# Patient Record
Sex: FEMALE | Race: BLACK OR AFRICAN AMERICAN | NOT HISPANIC OR LATINO | URBAN - METROPOLITAN AREA
[De-identification: names, ages, dates, MRNs, and addresses within clinical notes are randomized per-mention and may not be internally consistent; named-entity substitution may affect disease eponyms.]

---
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
## 2019-06-03 ENCOUNTER — EMERGENCY (EMERGENCY)
Facility: HOSPITAL | Age: 19
LOS: 0 days | Discharge: HOME | End: 2019-06-03
Attending: EMERGENCY MEDICINE | Admitting: EMERGENCY MEDICINE
Payer: MEDICAID

## 2019-06-03 VITALS
RESPIRATION RATE: 18 BRPM | TEMPERATURE: 98 F | HEART RATE: 99 BPM | OXYGEN SATURATION: 98 % | DIASTOLIC BLOOD PRESSURE: 63 MMHG | SYSTOLIC BLOOD PRESSURE: 119 MMHG

## 2019-06-03 VITALS — WEIGHT: 201.5 LBS

## 2019-06-03 DIAGNOSIS — Z88.0 ALLERGY STATUS TO PENICILLIN: ICD-10-CM

## 2019-06-03 DIAGNOSIS — M25.473 EFFUSION, UNSPECIFIED ANKLE: ICD-10-CM

## 2019-06-03 DIAGNOSIS — M79.89 OTHER SPECIFIED SOFT TISSUE DISORDERS: ICD-10-CM

## 2019-06-03 LAB
APPEARANCE UR: ABNORMAL
BILIRUB UR-MCNC: NEGATIVE — SIGNIFICANT CHANGE UP
COLOR SPEC: YELLOW — SIGNIFICANT CHANGE UP
DIFF PNL FLD: NEGATIVE — SIGNIFICANT CHANGE UP
EPI CELLS # UR: ABNORMAL /HPF
GLUCOSE UR QL: NEGATIVE MG/DL — SIGNIFICANT CHANGE UP
KETONES UR-MCNC: NEGATIVE — SIGNIFICANT CHANGE UP
LEUKOCYTE ESTERASE UR-ACNC: NEGATIVE — SIGNIFICANT CHANGE UP
NITRITE UR-MCNC: NEGATIVE — SIGNIFICANT CHANGE UP
PH UR: 6.5 — SIGNIFICANT CHANGE UP (ref 5–8)
PROT UR-MCNC: NEGATIVE MG/DL — SIGNIFICANT CHANGE UP
SP GR SPEC: <=1.005 — SIGNIFICANT CHANGE UP (ref 1.01–1.03)
UROBILINOGEN FLD QL: 1 MG/DL (ref 0.2–0.2)

## 2019-06-03 PROCEDURE — 73610 X-RAY EXAM OF ANKLE: CPT | Mod: 26,LT

## 2019-06-03 PROCEDURE — 71046 X-RAY EXAM CHEST 2 VIEWS: CPT | Mod: 26

## 2019-06-03 PROCEDURE — 99283 EMERGENCY DEPT VISIT LOW MDM: CPT

## 2019-06-03 RX ORDER — DIPHENHYDRAMINE HCL 50 MG
25 CAPSULE ORAL ONCE
Refills: 0 | Status: COMPLETED | OUTPATIENT
Start: 2019-06-03 | End: 2019-06-03

## 2019-06-03 RX ADMIN — Medication 25 MILLIGRAM(S): at 20:01

## 2019-06-03 NOTE — ED PROVIDER NOTE - PROGRESS NOTE DETAILS
xray is neg UA is neg patient doesn't want to stay in the ed xray is neg UA is neg patient doesn't want to stay in the ed.

## 2019-06-03 NOTE — ED PROVIDER NOTE - OBJECTIVE STATEMENT
18yF with no pmh 18yF with pmh mood disorder on ability, depakote, lithium, intunif, recent dx of chlamydia s/p 3/14 days of doxy, p/w BL ankle swelling and subjective L hand swelling X 2 days. no sob cp orthopnea pleurisy calf pain, trauma, fever chills erythema. no significant wt loss or gain, palpitations or temperature disturbance.

## 2019-06-03 NOTE — ED PROVIDER NOTE - CLINICAL SUMMARY MEDICAL DECISION MAKING FREE TEXT BOX
18yr with bl ankle swelling possible trauma xray is neg UA is neg patient wanted to leave the ed  ED evaluation and management discussed with the  patient in detail.  Close PMD follow up encouraged.  Strict ED return instructions discussed in detail and patient was given the opportunity to ask any questions about their discharge diagnosis and instructions. Patient  verbalized understanding.

## 2019-06-03 NOTE — ED PEDIATRIC NURSE NOTE - NSIMPLEMENTINTERV_GEN_ALL_ED
Implemented All Universal Safety Interventions:  [place] to call system. Call bell, personal items and telephone within reach. Instruct patient to call for assistance. Room bathroom lighting operational. Non-slip footwear when patient is off stretcher. Physically safe environment: no spills, clutter or unnecessary equipment. Stretcher in lowest position, wheels locked, appropriate side rails in place.

## 2019-06-03 NOTE — ED PROVIDER NOTE - PHYSICAL EXAMINATION
Vital Signs: I have reviewed the initial vital signs.  Constitutional: NAD, well-nourished, appears stated age, no acute distress.  HEENT: Airway patent, moist MM, no erythema/swelling/deformity of oral structures. EOMI, PERRLA.  CV: regular rate, regular rhythm, well-perfused extremities, 2+ b/l DP and radial pulses equal.  Lungs: BCTA, no increased WOB.  ABD: NTND, no guarding or rebound, no pulsatile mass, no hernias.   MSK: Neck supple, nontender, nl ROM, no stepoff. Chest nontender. Back nontender in TLS spine or to b/l bony structures or flanks. Ext nontender, nl rom, no deformity.   INTEG: Skin warm, dry, no rash. 1+ nonpitting edema BL  NEURO: A&Ox3, moving all extremities, normal speech  PSYCH: Calm, cooperative, normal affect and interaction.

## 2019-06-03 NOTE — ED PROVIDER NOTE - NS ED ROS FT
Review of Systems    Constitutional: (-) fever  Cardiovascular: (-) chest pain, (-) syncope  Respiratory: (-) cough, (-) shortness of breath  Gastrointestinal: (-) vomiting, (-) diarrhea, (-) abdominal pain  Musculoskeletal: (-) neck pain, (-) back pain, (-) joint pain  Integumentary: (-) rash, (+) edema  Neurological: (-) headache, (-) altered mental status    Except as documented in the HPI, all other systems are negative. Yes

## 2019-06-03 NOTE — ED PROVIDER NOTE - ATTENDING CONTRIBUTION TO CARE
18yr started doxycline for the past three days now noticed bl foot swelling in the ankles left hand swelling and right hand swelling no other symptoms and chornic cough no fever no chils no nausea no emesis  VS reviewed, stable.  Gen: interactive, well appearing, no acute distress  HEENT: NC/AT, TM non bulging bl no evidence of mastoiditis,  moist mucus membranes, pupils equal, responsive, reactive to light and accomodation, no conjunctivitis or scleral icterus; no nasal discharge .   OP no exudates no erythema  Neck: FROM, supple, no cervical LAD  Chest: CTA b/l, no crackles/wheezes, good air entry, no tachypnea or retractions  CV: regular rate and rhythm, no murmurs   Abd: soft, nontender, nondistended, no HSM appreciated, +BS  bl non pitting edema of the ankles non tender  plan - UA xray

## 2020-01-11 ENCOUNTER — EMERGENCY (EMERGENCY)
Facility: HOSPITAL | Age: 20
LOS: 0 days | Discharge: HOME | End: 2020-01-11
Attending: EMERGENCY MEDICINE | Admitting: EMERGENCY MEDICINE
Payer: MEDICAID

## 2020-01-11 VITALS
RESPIRATION RATE: 20 BRPM | SYSTOLIC BLOOD PRESSURE: 128 MMHG | DIASTOLIC BLOOD PRESSURE: 58 MMHG | HEART RATE: 68 BPM | OXYGEN SATURATION: 100 % | TEMPERATURE: 99 F

## 2020-01-11 DIAGNOSIS — F31.9 BIPOLAR DISORDER, UNSPECIFIED: ICD-10-CM

## 2020-01-11 DIAGNOSIS — R45.851 SUICIDAL IDEATIONS: ICD-10-CM

## 2020-01-11 DIAGNOSIS — Z88.0 ALLERGY STATUS TO PENICILLIN: ICD-10-CM

## 2020-01-11 PROCEDURE — 99283 EMERGENCY DEPT VISIT LOW MDM: CPT

## 2020-01-11 RX ORDER — ARIPIPRAZOLE 15 MG/1
1 TABLET ORAL
Qty: 3 | Refills: 0
Start: 2020-01-11 | End: 2020-01-13

## 2020-01-11 NOTE — ED PEDIATRIC TRIAGE NOTE - CHIEF COMPLAINT QUOTE
patient monicaa after friend called ems stating shes non compliant with medications patient denying si hi

## 2020-01-11 NOTE — ED BEHAVIORAL HEALTH ASSESSMENT NOTE - OTHER PAST PSYCHIATRIC HISTORY (INCLUDE DETAILS REGARDING ONSET, COURSE OF ILLNESS, INPATIENT/OUTPATIENT TREATMENT)
hx of bipolar disorder, ADHD, and ODD, no prior IPP admissions or suicide attempts, in outpatient tx with psychiatrist Dr. Brown and a therapist at [facility]

## 2020-01-11 NOTE — ED BEHAVIORAL HEALTH ASSESSMENT NOTE - MEDICATIONS (PRESCRIPTIONS, DIRECTIONS)
patient will discuss her psychiatric medications with Dr. Brown but at this time, since she has none of her medications accessible to her, recommend ED to prescribe abilify 5mg BID for mood lability (3 day supply)- Pt's home dosage is abilify 30mg Qdaily but she has not taken it for the past month

## 2020-01-11 NOTE — ED BEHAVIORAL HEALTH ASSESSMENT NOTE - DETAILS
none lithium- urinary incontinence pt reports that her biological mother has hx of bipolar d/o spoke to ED team

## 2020-01-11 NOTE — ED PROVIDER NOTE - NSFOLLOWUPINSTRUCTIONS_ED_ALL_ED_FT
Please follow up with your psychiatrist on Monday for further evaluation. Return to the emergency department sooner for any new or worrisome symptoms. Good luck.             Suicidal Feelings: How to Help Yourself  Suicide is when you end your own life. There are many things you can do to help yourself feel better when struggling with these feelings. Many services and people are available to support you and others who struggle with similar feelings.     If you ever feel like you may hurt yourself or others, or have thoughts about taking your own life, get help right away. To get help:   Call your local emergency services (911 in the [place]   Go to your nearest emergency department.   Call a suicide hotline to speak with a trained counselor. The following suicide hotlines are available in the [place]:  6-683-178-TALK (1-867.740.6038).  4-412-FKWANTF (1-513.388.5054).  1-676.893.3689. This is a hotline for [place] speakers.  1-477.186.6087. This is a hotline for TTY users.  5-833-5-U-JAMISON (1-641.706.6216). This is a hotline for lesbian, coronel, bisexual, transgender, or questioning youth.  For a list of hotlines in [place], visit www.suicide.org/hotlines/international/zigxal-mpzwfar-wgseomsf.html  Contact a crisis center or a local suicide prevention center. To find a crisis center or suicide prevention center:  Call your local hospital, clinic, community service organization, mental health center, social service provider, or health department. Ask for help with connecting to a crisis center.  For a list of crisis centers in the [place], visit: suicidepreventionlifeline.org  For a list of crisis centers in [place], visit: suicideprevention.ca  How to help yourself feel better  Image   Promise yourself that you will not do anything extreme when you have suicidal feelings. Remember, there is hope. Many people have gotten through suicidal thoughts and feelings, and you can too. If you have had these feelings before, remind yourself that you can get through them again.  Let family, friends, teachers, or counselors know how you are feeling. Try not to separate yourself from those who care about you and want to help you. Talk with someone every day, even if you do not feel sociable. Face-to-face conversation is best to help them understand your feelings.  Contact a mental health care provider and work with this person regularly.  Make a safety plan that you can follow during a crisis. Include phone numbers of suicide prevention hotlines, mental health professionals, and trusted friends and family members you can call during an emergency. Save these numbers on your phone.  If you are thinking of taking a lot of medicine, give your medicine to someone who can give it to you as prescribed. If you are on antidepressants and are concerned you will overdose, tell your health care provider so that he or she can give you safer medicines.  Try to stick to your routines. Follow a schedule every day. Make self-care a priority.  Make a list of realistic goals, and cross them off when you achieve them. Accomplishments can give you a sense of worth.  Wait until you are feeling better before doing things that you find difficult or unpleasant.  Do things that you have always enjoyed to take your mind off your feelings. Try reading a book, or listening to or playing music. Spending time outside, in nature, may help you feel better.  Follow these instructions at home:  Image   Visit your primary health care provider every year for a checkup.  Work with a mental health care provider as needed.  Eat a well-balanced diet, and eat regular meals.  Get plenty of rest.  Exercise if you are able. Just 30 minutes of exercise each day can help you feel better.  Take over-the-counter and prescription medicines only as told by your health care provider. Ask your mental health care provider about the possible side effects of any medicines you are taking.  Do not use alcohol or drugs, and remove these substances from your home.  Remove weapons, poisons, knives, and other deadly items from your home.  General recommendations  Keep your living space well lit.  When you are feeling well, write yourself a letter with tips and support that you can read when you are not feeling well.  Remember that life's difficulties can be sorted out with help. Conditions can be treated, and you can learn behaviors and ways of thinking that will help you.  Where to find more information  National Suicide Prevention Lifeline: www.suicidepreventionlifeline.org  Hopeline: www.hopeline.com  American Foundation for Suicide Prevention: www.afsp.org  The [place] Project (for lesbian, coronel, bisexual, transgender, or questioning youth): www.thetrevorproject.org  Contact a health care provider if:  You feel as though you are a burden to others.  You feel agitated, angry, vengeful, or have extreme mood swings.  You have withdrawn from family and friends.  Get help right away if:  You are talking about suicide or wishing to die.  You start making plans for how to commit suicide.  You feel that you have no reason to live.  You start making plans for putting your affairs in order, saying goodbye, or giving your possessions away.  You feel guilt, shame, or unbearable pain, and it seems like there is no way out.  You are frequently using drugs or alcohol.  You are engaging in risky behaviors that could lead to death.  If you have any of these symptoms, get help right away. Call emergency services, go to your nearest emergency department or crisis center, or call a suicide crisis helpline.     Summary  Suicide is when you take your own life.  Promise yourself that you will not do anything extreme when you have suicidal feelings.  Let family, friends, teachers, or counselors know how you are feeling.  Get help right away if you feel as though life is getting too tough to handle and you are thinking about suicide.  This information is not intended to replace advice given to you by your health care provider. Make sure you discuss any questions you have with your health care provider.    Document Released: 06/23/2004 Document Revised: 07/31/2018 Document Reviewed: 07/31/2018  Elsevier Interactive Patient Education © 2019 Elsevier Inc.

## 2020-01-11 NOTE — ED PROVIDER NOTE - OBJECTIVE STATEMENT
19yF pmhx bipolar disorder accompanied by father presents for suicidal ideation; per EMS, call received for pt w/ active suicidal thoughts; per pt, was at friend's house when friend and boyfriend got into argument, she hid in bathroom locked door and refused to get out, after trying to get her out friend called 911 reporting pt suicidal; both pt and father deny ever having hx of suicidal ideation, and pt states she is not currently suicidal, in addition denies homicidal ideation. Pt states she has not been taking her bipolar meds because "they make me wet the bed at night" and has not felt any increase in her symptoms since stopping a few months ago. Father reports pt having erratic behavior, has missed 2 appts w/ psychiatrist--pt states it is because she is trying to work in [place] and does not want to go back to [place]. Denies fever, n/v/d.

## 2020-01-11 NOTE — ED ADULT NURSE NOTE - PMH
Anxiety    Attention deficit hyperactivity disorder (ADHD), unspecified ADHD type    Bipolar 1 disorder

## 2020-01-11 NOTE — ED BEHAVIORAL HEALTH ASSESSMENT NOTE - DESCRIPTION
pt lying on ED stretcher in NAD w/ 1:1 at bedside none , single, adopted at age 7, has 4 biological siblings and is middle child, female, domiciled with her father and siblings in private residence, enrolled at [employer] for [employer] but recently suspended this past Thursday, wants to be a

## 2020-01-11 NOTE — ED PROVIDER NOTE - ATTENDING CONTRIBUTION TO CARE
19 year old female, pmhx of bipolar, bibems for evaluation of suicidal ideation, patient was at a friends house and the friend called ems stating that she is worried about the patient. Patient denies si or hi, no a/v hallucinations, no cp/sob, no n/v/d    CONSTITUTIONAL: Well-developed; well-nourished; in no acute distress. Sitting up and providing appropriate history and physical examination  SKIN: skin exam is warm and dry, no acute rash.  HEAD: Normocephalic; atraumatic.  EYES: PERRL, 3 mm bilateral, no nystagmus, EOM intact; conjunctiva and sclera clear.  ENT: No nasal discharge; airway clear.  NECK: Supple; non tender. + full passive ROM in all directions. No JVD  CARD: S1, S2 normal; no murmurs, gallops, or rubs. Regular rate and rhythm. + Symmetric Strong Pulses  RESP: No wheezes, rales or rhonchi. Good air movement bilaterally  ABD: soft; non-distended; non-tender. No Rebound, No Guarding, No signs of peritonitis, No CVA tenderness. No pulsatile abdominal mass. + Strong and Symmetric Pulses  EXT: Normal ROM. No clubbing, cyanosis or edema. Dp and Pt Pulses intact. Cap refill less than 3 seconds  NEURO: CN 2-12 intact, normal finger to nose, normal romberg, stable gait, no sensory or motor deficits, Alert, oriented, grossly unremarkable. No Focal deficits. GCS 15. NIH 0  PSYCH: Cooperative, appropriate. No si or hi, no a/v hallucinations

## 2020-01-11 NOTE — ED BEHAVIORAL HEALTH ASSESSMENT NOTE - SAFETY PLAN DETAILS
Pt instructed that should she have thoughts of wanting to harm herself or others, she should seek medical attention immediately.

## 2020-01-11 NOTE — ED PEDIATRIC NURSE REASSESSMENT NOTE - NS ED NURSE REASSESS COMMENT FT2
pt. alert and oriented times four. VSS. pt. denies any suicidal or homicidal ideations, pt. placed on 1:1 pt. undressed in room. psych at bedside. as per psych pt. stable for d'c will f/u with OP mental health pt. verbalized understanding of discharge instructions

## 2020-01-11 NOTE — ED PROVIDER NOTE - NS ED ROS FT
Review of Systems:  	•	CONSTITUTIONAL - no fever, no diaphoresis  	•	SKIN - no rash, no lesions  	•	HEMATOLOGIC - no bleeding, no bruising  	•	EYES - no discharge, no injection  	•	ENT - no otorrhea, no rhinorrhea  	•	RESPIRATORY - no shortness of breath, no cough  	•	CARDIAC - no chest pain, no palpitations  	•	GI - no abd pain, no nausea, no vomiting, no diarrhea  	•	GENITO-URINARY - no dysuria, no hematuria  	•	MUSCULOSKELETAL - no joint paint, no swelling, no redness  	•	NEUROLOGIC - no dizziness, no headache  	•	PSYCH - non suicidal, non homicidal

## 2020-01-11 NOTE — ED BEHAVIORAL HEALTH ASSESSMENT NOTE - OTHER
friend called [phone] attends [employer] school, recently suspended recent suspension from [employer] school deferred recently suspended from her [employer] school, argument with her friend today

## 2020-01-11 NOTE — ED ADULT NURSE NOTE - NSIMPLEMENTINTERV_GEN_ALL_ED
Implemented All Universal Safety Interventions:  [place] to call system. Call bell, personal items and telephone within reach. Instruct patient to call for assistance. Room bathroom lighting operational. Non-slip footwear when patient is off stretcher. Physically safe environment: no spills, clutter or unnecessary equipment. Stretcher in lowest position, wheels locked, appropriate side rails in place.

## 2020-01-11 NOTE — ED PEDIATRIC NURSE NOTE - PMH
Anxiety    Bipolar 1 disorder Anxiety    Attention deficit hyperactivity disorder (ADHD), unspecified ADHD type    Bipolar 1 disorder

## 2020-01-11 NOTE — ED PROVIDER NOTE - PHYSICAL EXAMINATION
Vital Signs: Reviewed  GEN: alert, NAD, speaks full sentences answers questions appropriately  HEAD:  normocephalic, atraumatic  EYES:  PERRLA; conjunctivae without injection, drainage or discharge  ENMT:  nasal mucosa moist; mouth moist without ulcerations or lesions; throat moist without erythema, exudate, ulcerations or lesions  NECK:  supple, no masses, no significant lymphadenopathy  CARDIAC:  regular rate, normal S1 and S2, no murmurs, rubs or gallops  RESP:  respiratory rate and effort appear normal for age; lungs are clear to auscultation bilaterally; no rales or wheezes  ABDOMEN:  soft, nontender, nondistended, no masses  MUSCULOSKELETAL/NEURO:  normal movement, normal tone  SKIN:  normal skin color for age and race, well-perfused; warm and dry

## 2020-01-11 NOTE — ED BEHAVIORAL HEALTH ASSESSMENT NOTE - SUICIDE RISK FACTORS
ADHD current/past/Recent onset of current/past psychiatric diagnosis/Family History of psychiatric diagnoses requiring hospitalization

## 2020-01-11 NOTE — ED BEHAVIORAL HEALTH ASSESSMENT NOTE - HPI (INCLUDE ILLNESS QUALITY, SEVERITY, DURATION, TIMING, CONTEXT, MODIFYING FACTORS, ASSOCIATED SIGNS AND SYMPTOMS)
This is a 20yo , single, adopted, female, domiciled with her father and siblings in private residence, enrolled at [employer] for [employer] but recently suspended this past Thursday, with history of bipolar disorder, ADHD, and ODD, no prior IPP admissions or suicide attempts, in outpatient tx with psychiatrist Dr. Brown and a therapist at [facility], who was BIBEMS after her friend made a call to 911 stating that patient was locked in the bathroom and suicidal, for which psychiatry was consulted.     On approach, patient was calm and cooperative. 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 She states that she was kicked out of the [employer] school because she "was a disruption to the environment" after she states she called police on one of the staff members for "touching my belongings". She states that while staying with her friend, "things were fine until she and her fiance started fighting and she told me I had to find somewhere else to live". She reports that she locked herself in the bathroom "to plan my next move". She states that her friend was trying to have her leave the apartment as quickly as possible and called 911 on her. She states that this friend is no longer her friend anymore and "she's done this before to other people. I never even said anything for her to think I'm suicidal. I have never been suicidal. I was just in the bathroom with the door locked and she didn't like that". She denies thoughts of wanting to harm herself or others. She denies any past suicide attempts. She denies any symptoms of depression, anxiety, and psychosis. She reports that she has a history of bipolar disorder and is prescribed abilify 30mg Qdaily, lithium ER 450mg BID and 150mg QHS, depakote 750mg BID, guanfacine ER 3mg Qdaily. She states that she has not been taking any of these medications for the past month stating "one of them was making me wet the bed and I don't know which one it was so I stopped them all". She reports that she is willing to start her medications again if the side effects are addressed. She reports that she had missed her last appointment with her psychiatrist Dr. Brown due to her being in [place] during the week for trade school. She denies any trauma history. She denies any past suicide attempts. She denies any current substance use.    spoke to pt's father- Angelo [phone]- He corroborated above psychiatric history and confirmed medications with patient's pharmacy bottle prescriptions. He states that patient has been off of her medications for the past month and "is more defiant". He states that his wife has been checking her FB profile and she suspects that patient is meeting men in the city to engage in sexual practices for money. He states he is worried that patient is going to go off to the city and party and is worried for her safety in this regard. He denies any past suicidal history of patient stating that she has never been suicidal and has not made any suicidal comments. He states that patient is "going to run away to party in the city". He denies any substance use history that he knows of for patient. He is pleading with writer to possibly keep patient overnight "to get medications in her system" so she can be "less defiant". He states that all of patient's medications are at [facility] wellness clinic in [place], [place] and that patient left all her medications there and won't have access to them until Monday 1/13/20. He is then requesting if "she can have medications until we pick the medications up".

## 2020-01-11 NOTE — ED BEHAVIORAL HEALTH ASSESSMENT NOTE - REFERRAL / APPOINTMENT DETAILS
Patient will follow up with her outpatient psychiatrist Dr. Brown at [facility] and was advised to make an appointment as soon as possible

## 2020-01-11 NOTE — ED BEHAVIORAL HEALTH ASSESSMENT NOTE - CURRENT MEDICATION
pt is prescribed abilify 30mg Qdaily, lithium ER 450mg BID and 150mg QHS, depakote 750mg BID, guanfacine ER 3mg Qdaily- has not been compliant with her medications for the past month

## 2020-01-11 NOTE — ED BEHAVIORAL HEALTH ASSESSMENT NOTE - RISK ASSESSMENT
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 Brown) and therapist at [facility]. Low Acute Suicide Risk

## 2020-01-11 NOTE — ED BEHAVIORAL HEALTH ASSESSMENT NOTE - SUMMARY
This is a 20yo , single, adopted, female, domiciled with her father and siblings in private residence, enrolled at [employer] for [employer] but recently suspended this past Thursday, with history of bipolar disorder, ADHD, and ODD, no prior IPP admissions or suicide attempts, in outpatient tx with psychiatrist Dr. Brown and a therapist at [facility], who was BIBEMS after her friend made a call to 911 stating that patient was locked in the bathroom and suicidal, for which psychiatry was consulted.     On assessment, patient is not acutely depressed, suicidal, psychotic, or manic. She is not an imminent danger to herself or others at this time and does not require inpatient psychiatric hospitalization. Patient is denying ever having made a suicidal statement and patient's father corroborates that patient does not have a history of any type of suicidality.     Patient does have a history of bipolar disorder and has been noncompliant with her medications for the past month. Patient has some concern about the side effects of what is likely lithium causing urinary incontinence. She is agreeable to be back on the remainder of her medications but does not have access to her pills at the moment as they are at the wellness clinic at [employer], which does not open until Monday. At this time, patient is not acutely agitated but she is in agreement that she should begin taking her medications again. She was advised to see her outpatient psychiatrist Dr. Brown and therapist as soon as possible to schedule appointment and discuss her medications and lack of compliance and side effects. She is in agreement. At this time, at patient and her father's request due to not having her medications currently, recommend ED team prescribe abilify 5mg BID for 3 days for mood lability until patient can obtain her medications on Monday 1/13/20. Patient and her father are agreeable with plan.

## 2020-01-11 NOTE — ED ADULT NURSE NOTE - OBJECTIVE STATEMENT
Pt came stating that friend of hers called 911 stating that she has suicidal ideations. Pt stated that she doesn't know why friend said that because she did not express to anyone that she is depressed or has and thought of hurting herself. Pt stated she goes to school and has normal day schedule and relationship with her parents. Pt also stated she sup[posed to be on Depakote, Lithium and Abilify but hasn't been taking it for about 6 months due to "she doesn't like how medications making her feel". Pt stated she addressed that issue with her psychiatrist but doctor would not change the medications, so she stopped taking it.

## 2020-01-11 NOTE — ED PROVIDER NOTE - CLINICAL SUMMARY MEDICAL DECISION MAKING FREE TEXT BOX
I personally evaluated the patient. I reviewed the Resident’s or Physician Assistant’s note (as assigned above), and agree with the findings and plan except as documented in my note. Patient evaluated by psychiatry, no si or hi, no a/v hallucinations. I have fully discussed the medical management and delivery of care with the patient. I have discussed any available labs, imaging and treatment options with the patient. Patient confirms understanding and has been given detailed return precautions. Patient instructed to return to the ED should symptoms persist or worsen. Patient has demonstrated capacity and has verbalized understanding. Patient is well appearing upon discharge. Father bedside

## 2020-01-11 NOTE — ED PROVIDER NOTE - NSFOLLOWUPCLINICS_GEN_ALL_ED_FT
[facility]  OP Mental Health  [address] [place] [id number]  Phone: [phone]  Fax:   Follow Up Time: 1-3 Days

## 2020-01-11 NOTE — ED PROVIDER NOTE - PATIENT PORTAL LINK FT
You can access the FollowMyHealth Patient Portal offered by [facility] by registering at the following website: [email or web address]. By joining [employer]’s FollowMyHealth portal, you will also be able to view your health information using other applications (apps) compatible with our system.

## 2020-12-15 NOTE — ED BEHAVIORAL HEALTH ASSESSMENT NOTE - FUND OF KNOWLEDGE
[General Appearance - Well Developed] : well developed [Normal Appearance] : normal appearance [General Appearance - Well Nourished] : well nourished [No Deformities] : no deformities [Normal Conjunctiva] : the conjunctiva exhibited no abnormalities [Eyelids - No Xanthelasma] : the eyelids demonstrated no xanthelasmas [Normal Oral Mucosa] : normal oral mucosa [Normal Oropharynx] : normal oropharynx [Normal Jugular Venous A Waves Present] : normal jugular venous A waves present [Normal Jugular Venous V Waves Present] : normal jugular venous V waves present [Respiration, Rhythm And Depth] : normal respiratory rhythm and effort [Auscultation Breath Sounds / Voice Sounds] : lungs were clear to auscultation bilaterally [Heart Rate And Rhythm] : heart rate and rhythm were normal [Heart Sounds] : normal S1 and S2 [Arterial Pulses Normal] : the arterial pulses were normal [FreeTextEntry1] : trace ankle edema [Bowel Sounds] : normal bowel sounds [Abdomen Soft] : soft [Abdomen Tenderness] : non-tender [Abnormal Walk] : normal gait [Nail Clubbing] : no clubbing of the fingernails [Cyanosis, Localized] : no localized cyanosis [Skin Color & Pigmentation] : normal skin color and pigmentation [Skin Turgor] : normal skin turgor [] : no rash [Oriented To Time, Place, And Person] : oriented to person, place, and time [Impaired Insight] : insight and judgment were intact [Affect] : the affect was normal Normal

## 2022-01-30 NOTE — ED ADULT NURSE NOTE - ORIENTED TO PERSON
Care Due:                  Date            Visit Type   Department     Provider  --------------------------------------------------------------------------------                                MYCHART                              FOLLOWUP/OF  [facility] INTERNAL  Last Visit: 07-      FICE VISIT   MEDICINE       EVAN PALAFOX  Next Visit: None Scheduled  None         None Found                                                            Last  Test          Frequency    Reason                     Performed    Due Date  --------------------------------------------------------------------------------    Office Visit  12 months..  lisinopriL...............  07- 07-    CMP.........  12 months..  lisinopriL...............  Not Found    Overdue    Powered by [employer]. Reference number: [id number].   1/30/2022 12:47:03 PM CST   Yes

## 2022-08-25 NOTE — ED BEHAVIORAL HEALTH ASSESSMENT NOTE - NS ED [facility] MED ROS RESPIRATORY
Pre-Sedation Assessment         History and Physical documented:  Patient has valid H&P within 30 days. I have reviewed and there are no changes.    Anesthesia/Sedation History:   Past Complications with Sedation/Anesthesia: No    ASA Classification:  Class 4 - A patient with an incapacitating systemic disease that is a constant threat to life    Airway Assessment:  Airway Anatomy Class II. Soft  palate uvula fauces visible. No difficulty    Plan for Sedation:  Moderate Sedation    Procedure Provider:   Saman Viveros MD    Pertinent history, assessment, medications, allergies, and pre-Procedure documentation reviewed?  YES    Patient suitable to undergo sedation and the risks, benefits, and alternatives have been discussed with patient/decision maker?    YES        
No complaints

## 2024-03-08 NOTE — ED ADULT NURSE NOTE - IS THE PATIENT ABLE TO BE SCREENED?
Discussed all of the following with the patient.    Patient to check their breasts (if applicable), buttocks, and groin with a mirror.  Patient deferred our examination of these areas today.    Each month, check your body for any spots such as freckles, age spots, and moles.  Watch for color changes and shape changes and growth in size.    Have your mejia or  pay attention to any dark color changes to moles of the scalp.    Moles, also called nevi, are small, colored (pigmented) marks on the skin. They have no known purpose. Many moles appear before age 30, but they also increase frequently as people age. Moles most often are not cancer (benign) and are harmless. But some become cancerous (malignant). Thats why you need to watch the moles on your body and tell your healthcare provider about any that concern you.    CRYOSURGERY      Your doctor has used a method called cryosurgery to treat your skin condition. Cryosurgery refers to the use of very cold substances to treat a variety of skin conditions such as warts, pre-skin cancers, molluscum contagiosum, sun spots, and several benign growths. The substance we use in cryosurgery is liquid nitrogen and is so cold (-195 degrees Celsius) that is burns when administered.     Following treatment in the office, the skin may immediately burn and become red. You may find the area around the lesion is affected as well. It is sometimes necessary to treat not only the lesion, but a small area of the surrounding normal skin to achieve a good response.     A blister, and even a blood filled blister, may form after treatment.   This is a normal response. If the blister is painful, it is acceptable to sterilize a needle and with rubbing alcohol and gently pop the blister. It is important that you gently wash the area with soap and warm water as the blister fluid may contain wart virus if a wart was treated. Do no remove the roof of the blister.     The area treated  can take anywhere from 1-3 weeks to heal. Healing time depends on the kind skin lesion treated, the location, and how aggressively the lesion was treated. It is recommended that the areas treated are covered with Vaseline or bacitracin ointment and a band-aid. If a band-aid is not practical, just ointment applied several times per day will do. Keeping these areas moist will speed the healing time.    Treatment with liquid nitrogen can leave a scar. In dark skin, it may be a light or dark scar, in light skin it may be a white or pink scar. These will generally fade with time, but may never go away completely.     If you have any concerns after your treatment, please feel free to call the office.       [address] [facility], [place] [address]/ [phone] FAX/ [email or web address]     Yes

## 2024-06-30 ENCOUNTER — OUTPATIENT (OUTPATIENT)
Dept: INPATIENT UNIT | Facility: HOSPITAL | Age: 24
LOS: 1 days | Discharge: HOME | End: 2024-06-30
Payer: MEDICAID

## 2024-06-30 VITALS
TEMPERATURE: 98 F | SYSTOLIC BLOOD PRESSURE: 129 MMHG | RESPIRATION RATE: 16 BRPM | HEART RATE: 92 BPM | DIASTOLIC BLOOD PRESSURE: 76 MMHG

## 2024-06-30 VITALS — SYSTOLIC BLOOD PRESSURE: 131 MMHG | DIASTOLIC BLOOD PRESSURE: 58 MMHG | HEART RATE: 78 BPM

## 2024-06-30 DIAGNOSIS — O26.899 OTHER SPECIFIED PREGNANCY RELATED CONDITIONS, UNSPECIFIED TRIMESTER: ICD-10-CM

## 2024-06-30 PROCEDURE — 99212 OFFICE O/P EST SF 10 MIN: CPT | Mod: 25

## 2024-06-30 PROCEDURE — 59025 FETAL NON-STRESS TEST: CPT | Mod: 26

## 2024-06-30 RX ORDER — PRENATAL VIT/IRON FUM/FOLIC AC 60 MG-1 MG
1 TABLET ORAL
Refills: 0 | DISCHARGE

## 2024-06-30 RX ORDER — DIVALPROEX SODIUM 500 MG/1
1 TABLET, DELAYED RELEASE ORAL
Qty: 0 | Refills: 0 | DISCHARGE

## 2024-06-30 RX ORDER — LITHIUM CARBONATE 300 MG/1
1 TABLET, EXTENDED RELEASE ORAL
Qty: 0 | Refills: 0 | DISCHARGE

## 2024-06-30 RX ORDER — FERROUS FUM/FOLIC ACID/BCOMP,C 100 MG-1MG
1 TABLET ORAL
Refills: 0 | DISCHARGE

## 2024-06-30 RX ORDER — ARIPIPRAZOLE 15 MG/1
1 TABLET ORAL
Qty: 0 | Refills: 0 | DISCHARGE

## 2024-07-01 PROBLEM — F41.9 ANXIETY DISORDER, UNSPECIFIED: Chronic | Status: ACTIVE | Noted: 2020-01-11

## 2024-07-01 PROBLEM — F90.9 ATTENTION-DEFICIT HYPERACTIVITY DISORDER, UNSPECIFIED TYPE: Chronic | Status: ACTIVE | Noted: 2020-01-11

## 2024-07-01 PROBLEM — F31.9 BIPOLAR DISORDER, UNSPECIFIED: Chronic | Status: ACTIVE | Noted: 2020-01-11

## 2024-07-02 DIAGNOSIS — F31.9 BIPOLAR DISORDER, UNSPECIFIED: ICD-10-CM

## 2024-07-02 DIAGNOSIS — O99.343 OTHER MENTAL DISORDERS COMPLICATING PREGNANCY, THIRD TRIMESTER: ICD-10-CM

## 2024-07-02 DIAGNOSIS — F90.9 ATTENTION-DEFICIT HYPERACTIVITY DISORDER, UNSPECIFIED TYPE: ICD-10-CM

## 2024-07-02 DIAGNOSIS — F41.9 ANXIETY DISORDER, UNSPECIFIED: ICD-10-CM

## 2024-07-02 DIAGNOSIS — O47.1 FALSE LABOR AT OR AFTER 37 COMPLETED WEEKS OF GESTATION: ICD-10-CM

## 2024-07-02 DIAGNOSIS — Z3A.38 38 WEEKS GESTATION OF PREGNANCY: ICD-10-CM

## 2024-07-02 DIAGNOSIS — O36.8330 MATERNAL CARE FOR ABNORMALITIES OF THE FETAL HEART RATE OR RHYTHM, THIRD TRIMESTER, NOT APPLICABLE OR UNSPECIFIED: ICD-10-CM

## 2024-07-02 DIAGNOSIS — O44.43 LOW LYING PLACENTA NOS OR WITHOUT HEMORRHAGE, THIRD TRIMESTER: ICD-10-CM

## 2024-07-07 ENCOUNTER — APPOINTMENT (OUTPATIENT)
Dept: ANTEPARTUM | Facility: CLINIC | Age: 24
End: 2024-07-07

## 2024-07-08 ENCOUNTER — OUTPATIENT (OUTPATIENT)
Dept: INPATIENT UNIT | Facility: HOSPITAL | Age: 24
LOS: 1 days | Discharge: HOME | End: 2024-07-08
Payer: MEDICAID

## 2024-07-08 VITALS
TEMPERATURE: 98 F | SYSTOLIC BLOOD PRESSURE: 127 MMHG | RESPIRATION RATE: 16 BRPM | DIASTOLIC BLOOD PRESSURE: 58 MMHG | HEART RATE: 70 BPM

## 2024-07-08 VITALS
SYSTOLIC BLOOD PRESSURE: 115 MMHG | TEMPERATURE: 98 F | RESPIRATION RATE: 16 BRPM | DIASTOLIC BLOOD PRESSURE: 72 MMHG | HEART RATE: 90 BPM

## 2024-07-08 DIAGNOSIS — O26.899 OTHER SPECIFIED PREGNANCY RELATED CONDITIONS, UNSPECIFIED TRIMESTER: ICD-10-CM

## 2024-07-08 PROCEDURE — 99221 1ST HOSP IP/OBS SF/LOW 40: CPT

## 2024-07-08 NOTE — OB RN TRIAGE NOTE - FALL HARM RISK - UNIVERSAL INTERVENTIONS
Bed in lowest position, wheels locked, appropriate side rails in place/Call bell, personal items and telephone in reach/Instruct patient to call for assistance before getting out of bed or chair/Non-slip footwear when patient is out of bed/Issue to call system/Physically safe environment - no spills, clutter or unnecessary equipment/Purposeful Proactive Rounding/Room/bathroom lighting operational, light cord in reach

## 2024-07-08 NOTE — OB PROVIDER TRIAGE NOTE - NSHPPHYSICALEXAM_GEN_ALL_CORE
Vital Signs Last 24 Hrs  T(C): 36.8 (08 Jul 2024 02:36), Max: 36.8 (08 Jul 2024 02:36)  T(F): 98.24 (08 Jul 2024 02:36), Max: 98.24 (08 Jul 2024 02:36)  HR: 69 (08 Jul 2024 02:59) (64 - 90)  BP: 123/82 (08 Jul 2024 02:36) (115/72 - 123/82)  BP(mean): --  RR: 16 (08 Jul 2024 02:07) (16 - 16)  SpO2: 97% (08 Jul 2024 02:59) (95% - 98%)      gen: a/o x 3, NAD   pulm: breathing unlabored on room air  abd: soft and nontender, gravid  SVE: 0/50/-3, no evidence LOF/VB  TAS: vertex by sono. posterior placenta. ROEL 10.6. m wave 137 bpm. BPP 8/8. image saved in ASOB  EFM: baseline 120 with mod variability, pos accels  TOCO: irregular contractions Vital Signs Last 24 Hrs  T(C): 36.8 (08 Jul 2024 02:36), Max: 36.8 (08 Jul 2024 02:36)  T(F): 98.24 (08 Jul 2024 02:36), Max: 98.24 (08 Jul 2024 02:36)  HR: 69 (08 Jul 2024 02:59) (64 - 90)  BP: 123/82 (08 Jul 2024 02:36) (115/72 - 123/82)  BP(mean): --  RR: 16 (08 Jul 2024 02:07) (16 - 16)  SpO2: 97% (08 Jul 2024 02:59) (95% - 98%)      gen: a/o x 3, NAD   pulm: breathing unlabored on room air  abd: soft and nontender, gravid  SVE: 1/50/-3, no evidence LOF/VB  TAS: vertex by sono. posterior placenta. ROEL 15.98cm m-mode 119 bpm. BPP 8/8. image saved in ASOB  EFM: baseline 120 with mod variability, pos accels  TOCO: irregular contractions

## 2024-07-08 NOTE — OB PROVIDER TRIAGE NOTE - NSOBPROVIDERNOTE_OBGYN_ALL_OB_FT
23 y/o female  @ 40 weeks GA with SLIUP presents to triage c/o contractions 25 y/o female  @ 40 weeks GA with SLIUP presents to triage c/o contractions  0330 discuss with Dr. Phi hood S/S of labor at this time   stable for discharge  Pt to keep self very well hydrated  Discharge patient home.  Labor precautions and fetal movements count were reviewed; if not in labor, will follow up with OB for the next schedule appointment  Plan of care was reviewed with patient and family; patient states understanding of the above plan.  Pt discharged home @ 0340A

## 2024-07-08 NOTE — OB RN TRIAGE NOTE - NSICDXPASTMEDICALHX_GEN_ALL_CORE_FT
PAST MEDICAL HISTORY:  Anxiety     Attention deficit hyperactivity disorder (ADHD), unspecified ADHD type     Bipolar 1 disorder

## 2024-07-08 NOTE — OB PROVIDER TRIAGE NOTE - HISTORY OF PRESENT ILLNESS
25 y/o female  @ 40 weeks GA with SLIUP presents to triage c/o contractions. reports contractions q 4minutes rated 8-9/10 in pain, defers pain meds.   reports GFM. denies vaginal bleeding, leakage of fluid.   Denies fever, chills, headaches, changes in vision, chest pain, palpitations, shortness of breath, cough, nausea, vomiting, diarrhea, constipation, urinary symptoms, edema.   Pt reports she is being followed for placenta issues  - I reviewed her records she has with her and appears she was evaluated for possible placenta accreta (due to hx manual removal placenta) with no evidence as of recent sonogram 24, and 24   x 2 ,  - manual removal of placenta

## 2024-07-10 DIAGNOSIS — O99.343 OTHER MENTAL DISORDERS COMPLICATING PREGNANCY, THIRD TRIMESTER: ICD-10-CM

## 2024-07-10 DIAGNOSIS — O99.013 ANEMIA COMPLICATING PREGNANCY, THIRD TRIMESTER: ICD-10-CM

## 2024-07-10 DIAGNOSIS — Z3A.40 40 WEEKS GESTATION OF PREGNANCY: ICD-10-CM

## 2024-07-10 DIAGNOSIS — F90.9 ATTENTION-DEFICIT HYPERACTIVITY DISORDER, UNSPECIFIED TYPE: ICD-10-CM

## 2024-07-10 DIAGNOSIS — O48.0 POST-TERM PREGNANCY: ICD-10-CM

## 2024-07-10 DIAGNOSIS — D64.9 ANEMIA, UNSPECIFIED: ICD-10-CM

## 2024-07-10 DIAGNOSIS — O47.1 FALSE LABOR AT OR AFTER 37 COMPLETED WEEKS OF GESTATION: ICD-10-CM

## 2024-07-10 DIAGNOSIS — F31.9 BIPOLAR DISORDER, UNSPECIFIED: ICD-10-CM
